# Patient Record
Sex: MALE | Race: WHITE | Employment: OTHER | URBAN - METROPOLITAN AREA
[De-identification: names, ages, dates, MRNs, and addresses within clinical notes are randomized per-mention and may not be internally consistent; named-entity substitution may affect disease eponyms.]

---

## 2017-09-07 ENCOUNTER — TELEPHONE (OUTPATIENT)
Dept: SURGERY | Age: 72
End: 2017-09-07

## 2017-09-13 ENCOUNTER — OFFICE VISIT (OUTPATIENT)
Dept: SURGERY | Age: 72
End: 2017-09-13

## 2017-09-13 VITALS
WEIGHT: 197 LBS | OXYGEN SATURATION: 98 % | HEIGHT: 75 IN | HEART RATE: 53 BPM | BODY MASS INDEX: 24.49 KG/M2 | RESPIRATION RATE: 16 BRPM | SYSTOLIC BLOOD PRESSURE: 138 MMHG | DIASTOLIC BLOOD PRESSURE: 80 MMHG | TEMPERATURE: 97.8 F

## 2017-09-13 DIAGNOSIS — R10.32 LEFT LOWER QUADRANT PAIN: Primary | ICD-10-CM

## 2017-09-13 NOTE — PROGRESS NOTES
1. Have you been to the ER, urgent care clinic since your last visit? Hospitalized since your last visit? No    2. Have you seen or consulted any other health care providers outside of the [address] [place] [address] since your last visit? Include any pap smears or colon screening.  Yes - PCP

## 2017-09-13 NOTE — MR AVS SNAPSHOT
Visit Information Date & Time Provider Department Dept. Phone Encounter #  
 9/13/2017 11:20 AM Alita Meckel, [phone] [id number] Upcoming Health Maintenance Date Due Hepatitis C Screening 1945 DTaP/Tdap/Td series (1 - Tdap) 9/21/1966 FOBT Q 1 YEAR AGE 50-75 9/21/1995 ZOSTER VACCINE AGE 60> 7/21/2005 GLAUCOMA SCREENING Q2Y 9/21/2010 Pneumococcal 65+ Low/Medium Risk (1 of 2 - PCV13) 9/21/2010 MEDICARE YEARLY EXAM 9/21/2010 INFLUENZA AGE 9 TO ADULT 8/1/2017 Allergies as of 9/13/2017  Review Complete On: 9/13/2017 By: Dwight Marni LPN No Known Allergies Current Immunizations  Never Reviewed No immunizations on file. Not reviewed this visit Vitals BP Pulse Temp Resp Height(growth percentile) Weight(growth percentile) 138/80 (BP 1 Location: Right arm, BP Patient Position: Sitting) (!) 53 97.8 °F (36.6 °C) (Oral) 16 6' 3\" (1.905 m) 197 lb (89.4 kg) SpO2 BMI Smoking Status 98% 24.62 kg/m2 Former Smoker BMI and BSA Data Body Mass Index Body Surface Area  
 24.62 kg/m 2 2.18 m 2 Preferred Pharmacy Pharmacy Name Phone [facility], [address] [place] [address] Your Updated Medication List  
  
   
This list is accurate as of: 9/13/17 11:59 PM.  Always use your most recent med list.  
  
  
  
  
 FLOMAX PO Take  by mouth. PREDNISONE PO Take  by mouth. \"7 1/2 mg per day\" Introducing [facility]! [facility] introduces [employer] patient portal. Now you can access parts of your medical record, email your doctor's office, and request medication refills online. 1. In your internet browser, go to [email or web address] 2. Click on the First Time User? Click Here link in the Sign In box. You will see the New Member Sign Up page. 3. Enter your [employer] Access Code exactly as it appears below. You will not need to use this code after youve completed the sign-up process. If you do not sign up before the expiration date, you must request a new code. · [employer] Access Code: [email or web address] Expires: 12/13/2017  9:22 AM 
 
4. Enter the last four digits of your Social Security Number (xxxx) and Date of Birth (mm/dd/yyyy) as indicated and click Submit. You will be taken to the next sign-up page. 5. Create a [employer] ID. This will be your [employer] login ID and cannot be changed, so think of one that is secure and easy to remember. 6. Create a [employer] password. You can change your password at any time. 7. Enter your Password Reset Question and Answer. This can be used at a later time if you forget your password. 8. Enter your e-mail address. You will receive e-mail notification when new information is available in [address] 9. Click Sign Up. You can now view and download portions of your medical record. 10. Click the Download Summary menu link to download a portable copy of your medical information. If you have questions, please visit the Frequently Asked Questions section of the [employer] website. Remember, [employer] is NOT to be used for urgent needs. For medical emergencies, dial 911. Now available from your iPhone and Android! Please provide this summary of care documentation to your next provider. Your primary care clinician is listed as Claudia Auguste. If you have any questions after today's visit, please call [phone].

## 2017-09-14 NOTE — PROGRESS NOTES
Surgery Consult    Subjective:      Rhiannon Escobedo is a 70 y.o.  male who was referred for evaluation of recent episode of abdominal pain. He has ongoing polymyalgia rheumatica being treated wit Prednisone 7.5 mg. The event was the acute onset of abdominal pain that was severe with exacerbations. Lasted for s day or two. In [address] with IV hydration. No prior history of such pain nor any surgical history as well. He did have bilateral laparoscopic inguinal hernia repairs several years ago. Did not have fever or chills with his attack./    There are no active problems to display for this patient. Past Medical History:   Diagnosis Date    Enlarged prostate     Nodule of kidney     Polymyalgia rheumatica (HCC)       Past Surgical History:   Procedure Laterality Date    HX HEENT  age 10    tonsilectomy    HX HERNIA REPAIR  2007 (?)    miguel[address] ngel. inguinal hernia repair    HX VASECTOMY        Social History   Substance Use Topics    Smoking status: Former Smoker     Quit date: 1/1/1990    Smokeless tobacco: Never Used    Alcohol use Yes      Comment: 12-14 beers per week      Family History   Problem Relation Age of Onset    Cancer Brother      prostate      Current Outpatient Prescriptions   Medication Sig    PREDNISONE PO Take  by mouth. \"7 1/2 mg per day\"    TAMSULOSIN HCL (FLOMAX PO) Take  by mouth. No current facility-administered medications for this visit. No Known Allergies     Review of Systems:    A comprehensive review of systems was negative except for: Allergic/Immunologic: positive for polymyalgia rheumatica    Objective:     @IPVITALS[8:@    @SODK[61@    Physical Exam:  GENERAL: alert, cooperative, no distress, appears stated age, LYMPHATIC: Cervical, supraclavicular, and axillary nodes normal. , ABDOMEN: soft, non-tender. Bowel sounds normal. No masses,  no organomegaly    Labs: No results found for this or any previous visit (from the past 24 hour(s)).     Data Review:  Radiology review: CT report from [address] [place] [address] showed a 2.2 cm right renal mass and some dilated fluid filled loops of small intestine. I did not review the gibson themselves. Assessment:     I believe he probably had a viral enteritis, given the suddenness of his symptoms and the prompt resolution of his symptoms. Plan:     Follow up on his renal lesion as he is planning to do. Regarding his small bowel I would ignore it for now. Were any symptoms to recur would investigate further, with repeat CT and possible CT or MR enterography.       Signed By: Jessica Burton MD     September 14, 2017

## 2021-04-19 ENCOUNTER — TELEPHONE (OUTPATIENT)
Dept: UROLOGY | Age: 76
End: 2021-04-19

## 2021-04-20 NOTE — TELEPHONE ENCOUNTER
VV should be fine; he should understand that they are limiting as far as an exam goes, but if otherwise, fine to do VV.

## 2021-05-13 ENCOUNTER — TELEPHONE (OUTPATIENT)
Dept: UROLOGY | Age: 76
End: 2021-05-13

## 2021-05-13 DIAGNOSIS — N40.1 BENIGN PROSTATIC HYPERPLASIA WITH LOWER URINARY TRACT SYMPTOMS, SYMPTOM DETAILS UNSPECIFIED: Primary | ICD-10-CM

## 2021-05-13 RX ORDER — TAMSULOSIN HYDROCHLORIDE 0.4 MG/1
0.4 CAPSULE ORAL DAILY
Qty: 30 CAP | Refills: 2 | Status: SHIPPED | OUTPATIENT
Start: 2021-05-13 | End: 2021-08-12

## 2021-05-13 NOTE — TELEPHONE ENCOUNTER
ishan [facility] pharmacy , needs refill for Tamulsoin, says he will run out before virtual appt.

## 2021-05-24 DIAGNOSIS — R35.1 NOCTURIA: ICD-10-CM

## 2021-05-24 PROBLEM — C64.9 PRIMARY MALIGNANT NEOPLASM OF KIDNEY (HCC): Status: ACTIVE | Noted: 2021-05-24

## 2021-05-24 PROBLEM — N40.0 BPH (BENIGN PROSTATIC HYPERPLASIA): Status: ACTIVE | Noted: 2021-05-24

## 2021-08-12 DIAGNOSIS — N40.1 BENIGN PROSTATIC HYPERPLASIA WITH LOWER URINARY TRACT SYMPTOMS, SYMPTOM DETAILS UNSPECIFIED: ICD-10-CM

## 2021-08-12 RX ORDER — TAMSULOSIN HYDROCHLORIDE 0.4 MG/1
CAPSULE ORAL
Qty: 30 CAPSULE | Refills: 2 | Status: SHIPPED | OUTPATIENT
Start: 2021-08-12

## 2022-03-19 PROBLEM — C64.9 PRIMARY MALIGNANT NEOPLASM OF KIDNEY (HCC): Status: ACTIVE | Noted: 2021-05-24

## 2022-03-19 PROBLEM — R35.1 NOCTURIA: Status: ACTIVE | Noted: 2021-05-24

## 2022-03-19 PROBLEM — N40.0 BPH (BENIGN PROSTATIC HYPERPLASIA): Status: ACTIVE | Noted: 2021-05-24

## 2023-05-25 RX ORDER — TAMSULOSIN HYDROCHLORIDE 0.4 MG/1
1 CAPSULE ORAL DAILY
COMMUNITY
Start: 2021-08-12